# Patient Record
Sex: FEMALE | Race: WHITE | URBAN - METROPOLITAN AREA
[De-identification: names, ages, dates, MRNs, and addresses within clinical notes are randomized per-mention and may not be internally consistent; named-entity substitution may affect disease eponyms.]

---

## 2021-05-21 ENCOUNTER — OFFICE VISIT (OUTPATIENT)
Dept: URBAN - METROPOLITAN AREA CLINIC 52 | Facility: CLINIC | Age: 66
End: 2021-05-21
Payer: COMMERCIAL

## 2021-05-21 DIAGNOSIS — H25.13 AGE-RELATED NUCLEAR CATARACT, BILATERAL: ICD-10-CM

## 2021-05-21 DIAGNOSIS — B00.52 HERPESVIRAL KERATITIS: Primary | ICD-10-CM

## 2021-05-21 PROCEDURE — 92004 COMPRE OPH EXAM NEW PT 1/>: CPT | Performed by: OPHTHALMOLOGY

## 2021-05-21 RX ORDER — VALACYCLOVIR 500 MG/1
500 MG TABLET, FILM COATED ORAL
Qty: 14 | Refills: 0 | Status: ACTIVE
Start: 2021-05-21

## 2021-05-21 ASSESSMENT — INTRAOCULAR PRESSURE
OD: 9
OS: 16
OD: 16
OS: 11

## 2021-05-21 NOTE — IMPRESSION/PLAN
Impression: Herpesviral keratitis: B00.52. Plan: Discussed diagnosis with patient. Will have patient start Valacyclovir 500mg PO BID x 7 days then discontinue. Will monitor closely. Patient to call us sooner, onset of new visual changes.

## 2022-06-01 ENCOUNTER — OFFICE VISIT (OUTPATIENT)
Dept: URBAN - METROPOLITAN AREA CLINIC 15 | Facility: CLINIC | Age: 67
End: 2022-06-01
Payer: COMMERCIAL

## 2022-06-01 DIAGNOSIS — H25.13 AGE-RELATED NUCLEAR CATARACT, BILATERAL: Primary | ICD-10-CM

## 2022-06-01 DIAGNOSIS — H40.013 OPEN ANGLE WITH BORDERLINE FINDINGS, LOW RISK, BILATERAL: ICD-10-CM

## 2022-06-01 PROCEDURE — 92133 CPTRZD OPH DX IMG PST SGM ON: CPT | Performed by: OPHTHALMOLOGY

## 2022-06-01 PROCEDURE — 99214 OFFICE O/P EST MOD 30 MIN: CPT | Performed by: OPHTHALMOLOGY

## 2022-06-01 ASSESSMENT — INTRAOCULAR PRESSURE
OS: 16
OD: 16

## 2022-06-01 NOTE — IMPRESSION/PLAN
Impression: Open angle with borderline findings, low risk, bilateral: H40.013. Plan:  RNFL obtained today in office and reviewed with patient. Patient Does have any family history of glaucoma. Discussed that pending continued IOP results, we may initiate drop therapy. Patient understands and agrees with plan.